# Patient Record
Sex: FEMALE | Race: BLACK OR AFRICAN AMERICAN | Employment: OTHER | URBAN - METROPOLITAN AREA
[De-identification: names, ages, dates, MRNs, and addresses within clinical notes are randomized per-mention and may not be internally consistent; named-entity substitution may affect disease eponyms.]

---

## 2017-12-06 ENCOUNTER — HOSPITAL ENCOUNTER (OUTPATIENT)
Dept: PHYSICAL THERAPY | Age: 82
Discharge: HOME | End: 2017-12-06
Payer: MEDICARE

## 2017-12-06 ENCOUNTER — APPOINTMENT (OUTPATIENT)
Dept: PHYSICAL THERAPY | Age: 82
End: 2017-12-06
Payer: MEDICARE

## 2017-12-06 PROCEDURE — 97163 PT EVAL HIGH COMPLEX 45 MIN: CPT

## 2017-12-06 PROCEDURE — G8978 MOBILITY CURRENT STATUS: HCPCS

## 2017-12-06 PROCEDURE — G8979 MOBILITY GOAL STATUS: HCPCS

## 2017-12-06 PROCEDURE — 97530 THERAPEUTIC ACTIVITIES: CPT

## 2017-12-06 PROCEDURE — 97110 THERAPEUTIC EXERCISES: CPT

## 2017-12-06 NOTE — PROGRESS NOTES
PT DAILY TREATMENT NOTE - [facility]     Patient Name: Werner Bee  Date:2017  : 1931  [x]  Patient  Verified  Payor: [place] MED[place]ARE / Plan: [place] MEDICARE PART A & B / Product Type: Medicare /    In time:230  Out time:330  Total Treatment Time (min): 60  Total Timed Codes (min): 23  1:1 Treatment Time ( [address] only): 60   Visit #: 1 of 10    Treatment Area:  Other abnormalities of gait and mobility [R26.89]    Physical Therapy Evaluation  Neurologic    SUBJECTIVE      Any medication changes, allergies to medications, adverse drug reactions, diagnosis change, or new procedure performed?: [x] No    [] Yes (see summary sheet for update)    Subjective functional status/changes:     PLOF: (I) Self-care and household ADLs, FWW with outside ambulation, SPC with in-home ambulation, No falls >6 months, Chronic reduction in overall activity tolerance  Current Functional Status: Use of FWW with outdoor ambulation, FWW/SPC with in-home ambulation, Assistance with self-care ADLs, Assistance with completion of household chores  Work Hx: Does not work  Living Situation: Lives in 1 story (No [employer]) - Lives alone  Comorbidities: Diabetes, HTN, Arthritis, Thyroid Problems, Depression  Medications: None taken secondary to R knee pain    Pain Intensity (0-10, VAS): Current 4, Worst 10, Best 0    Patient Goals: \"Walk better\", \"Not be dizzy\"    OBJECTIVE EXAMINATION    BP: 108/58 mmHg (sit-to-stand: 88/44 mmHg)  HR: 80 bpm    Cervical Screen: Patient without reproduction of dizziness with completion of following cervical AROM: Flexion, Extension, L Rotation, R Rotation  Ocular AROM: Slight reproduction of dizziness with upward gaze    Gait: Slowed gait with use of FWW    Strength (MMT): (tested in seated posture)      Hip L (1-5) R (1-5)   Hip Flexion 3+ 3+   Hip Ext NT NT   Hip ABD NT NT   Hip ER 4 3+   Hip IR 4 3+     Knee L (1-5) R (1-5)   Knee Flexion 5 5   Knee Extension 3+ 3+   Ankle PF     Ankle DF 5 4     Sensation: Intact, symmetrical sensation L2-S1 bilaterally    **Secondary to significant reduction in patient BP with completion of sit-to-stand and patient recent admittance to hospital secondary to SoB initial evaluation limited in overall thoroughness in order to ensure patient overall tolerance. OBJECTIVE    37 min [x]Eval                  []Re-Eval     10 min Therapeutic Exercise:  [x] See flow sheet : Patient educated in completion of prescribed HEP and provided with written HEP instructions, Patient and family educated regarding diagnosis and PT PoC   Rationale: increase ROM and increase strength to improve the patients ability to improve overall activity tolerance and functional independence    13 min Therapeutic Activity:  [x]  See flow sheet : Reviewed with family and patient ways to avoid dizziness secondary to presentation consistent with orthostatic hypotension with functional ADLs with review of the importance of generalized activity to improve BP   Rationale: increase ROM and increase strength  to improve the patients ability to improve safety with functional household ADLs     With   [] TE   [] TA   [] neuro   [] other: Patient Education: [x] Review HEP    [] Progressed/Changed HEP based on:   [] positioning   [] body mechanics   [] transfers   [] heat/ice application    [] other:      Pain Level (0-10 scale) post treatment: 4    ASSESSMENT/Changes in Function: Patient presents with an insidious onset of generalized weakness and deconditioning 2 months prior to I.E with patient with PMH significant for diabetes and HTN. Patient with recent hospitalization 12/4-12/5 secondary to shortness of breath with patient reporting all cardiac testing normal with patient having received MD clearance prior to PT evaluation. Patient reports generalized lightheadedness and \"fogginess\", with exacerbation reported with prolonged physical activity, quick movements and transitional positions.  Patient reports history of one fall November 2017 with patient reporting no dizziness prior but reports loss of consciousness afterwards. Secondary to symptoms patient reports requiring onset of assistance with completion of self-care and household ADLs within last few months. Patient demonstrates normal cervical AROM with slight dizziness reported with non-specific cervical movement with resolution with return to neutral cervical positioning. Slight dizziness reproduction demonstrated with upward visual gaze. Patient demonstrates signs and symptoms consistent with orthostatic hypotension with reduction in BP from 108/58 mmHg to 88/44 mmHg with completion of sit-to-stand with patient subjectively symptomatic secondarily. With completion of seated LE strength assessment patient demonstrates generalized LE weakness (R>L) with patient demonstrating intact, symmetrical sensation bilaterally. Patient noted with bilateral LE peripheral edema, non-pitting, with patient reporting prescription of thigh-high compression stockings while hospitalized secondarily. Secondary to significant reduction in patient BP with completion of sit-to-stand and patient recent admittance to hospital secondary to [facility] initial evaluation limited in overall thoroughness in order to ensure patient overall tolerance. Question onset of generalized weakness and deconditioning secondary to hypotensive presentation with emphasis of treatment to be placed on improving generalized activity tolerance, strength of bilateral LE and cardiovascular endurance to decrease falls risk and improve independence. With hypotensive presentation patient may be limited in tolerance to within clinic treatment.     Patient will continue to benefit from skilled PT services to modify and progress therapeutic interventions, address functional mobility deficits, address ROM deficits, address strength deficits, analyze and address soft tissue restrictions, analyze and cue movement patterns, analyze and modify body mechanics/ergonomics and assess and modify postural abnormalities to attain remaining goals. [x]  See Plan of Care  []  See progress note/recertification  []  See Discharge Summary         Progress towards goals / Updated goals:    Short Term Goals: To be accomplished in 2-3 weeks:  1. Patient will subjectively report full compliance with prescribed HEP. Eval: HEP provided  2. Patient will demonstrate ability to tolerate 30 minutes of therapeutic exercise with minimal rest breaks in order to improve activity tolerance. Eval: NT  3. Patient will demonstrate L/R hip flexion and L/R knee extension MMT >/=4+/5 to improve ease with stair management. Eval: L Hip Flex 3+/5, L Knee Ext 3+/5, R Hip Flex 3+/5, R Knee Ext 3+/5    Long Term Goals: To be accomplished in 5 weeks:  1. Patient will demonstrate a significant functional improvement as demonstrated by a score of >/= 58 on FOTO. Eval: FOTO = 12  2. Patient will demonstrate ability to complete TUG (10 ft) </=20 seconds to thus demonstrate a reduced falls risk. Eval: NT, Secondary to poor activity tolerance  3. Patient will demonstrate ability to ascend/descend 4 stairs with a step-to gait pattern with single UE handrail assist and SPC with superversion to improve community access.    Eval: NT, Secondary to poor activity tolerance    PLAN  [x]  Upgrade activities as tolerated     []  Continue plan of care  []  Update interventions per flow sheet       []  Discharge due to:_  []  Other:_      Balwinder Davis PT 12/6/2017  1:24 PM    Future Appointments  Date Time Provider Francisco Vazquez   12/6/2017 2:30 PM Balwinder Davis PT [facility]

## 2017-12-06 NOTE — PROGRESS NOTES
[facility]              [address]        [place], [address] [place]   [phone] fax    Plan of Care/ Statement of Necessity for Physical Therapy Services    Patient name: Immanuel Bowers Start of Care: 2017   Referral source: Gretchen Bowen MD : 1931    Medical Diagnosis: Other abnormalities of gait and mobility [R26.89]   Onset Date:2-3 months prior to I.E. Treatment Diagnosis: Generalized Deconditioning   Prior Hospitalization: see medical history Provider#: [id number]   Medications: Verified on Patient summary List    Comorbidities: Diabetes, HTN, Arthritis, Thyroid Problems, Depression, R TKA, R SIGRID (anterior approach)   Prior Level of Function: (I) Self-care and household ADLs, FWW with outside ambulation, SPC with in-home ambulation, No falls >6 months, Chronic reduction in overall activity tolerance      The Plan of Care and following information is based on the information from the initial evaluation. Assessment:    Patient presents with an insidious onset of generalized weakness and deconditioning 2 months prior to I.E with patient with PMH significant for diabetes and HTN. Patient with recent hospitalization - secondary to shortness of breath with patient reporting all cardiac testing normal with patient having received MD clearance prior to PT evaluation. Patient reports generalized lightheadedness and \"fogginess\", with exacerbation reported with prolonged physical activity, quick movements and transitional positions. Patient reports history of one fall 2017 with patient reporting no dizziness prior but reports loss of consciousness afterwards. Secondary to symptoms patient reports requiring onset of assistance with completion of self-care and household ADLs within last few months.  Patient demonstrates normal cervical AROM with slight dizziness reported with non-specific cervical movement with resolution with return to neutral cervical positioning. Slight dizziness reproduction demonstrated with upward visual gaze. Patient demonstrates signs and symptoms consistent with orthostatic hypotension with reduction in BP from 108/58 mmHg to 88/44 mmHg with completion of sit-to-stand with patient subjectively symptomatic secondarily. With completion of seated LE strength assessment patient demonstrates generalized LE weakness (R>L) with patient demonstrating intact, symmetrical sensation bilaterally. Patient noted with bilateral LE peripheral edema, non-pitting, with patient reporting prescription of thigh-high compression stockings while hospitalized secondarily. Secondary to significant reduction in patient BP with completion of sit-to-stand and patient recent admittance to hospital secondary to [facility] initial evaluation limited in overall thoroughness in order to ensure patient overall tolerance. Question onset of generalized weakness and deconditioning secondary to hypotensive presentation with emphasis of treatment to be placed on improving generalized activity tolerance, strength of bilateral LE and cardiovascular endurance to decrease falls risk and improve independence. With hypotensive presentation patient may be limited in tolerance to within clinic treatment.     Patient will continue to benefit from skilled PT services to modify and progress therapeutic interventions, address functional mobility deficits, address ROM deficits, address strength deficits, analyze and address soft tissue restrictions, analyze and cue movement patterns, analyze and modify body mechanics/ergonomics and assess and modify postural abnormalities to attain remaining goals.     Key Information:    BP: 108/58 mmHg (sit-to-stand: 88/44 mmHg)  HR: 80 bpm     Cervical Screen: Patient without reproduction of dizziness with completion of following cervical AROM: Flexion, Extension, L Rotation, R Rotation  Ocular AROM: Slight reproduction of dizziness with upward gaze     Gait: Slowed gait with use of FWW     Strength (MMT): (tested in seated posture)      Hip L (1-5) R (1-5)   Hip Flexion 3+ 3+   Hip Ext NT NT   Hip ABD NT NT   Hip ER 4 3+   Hip IR 4 3+      Knee L (1-5) R (1-5)   Knee Flexion 5 5   Knee Extension 3+ 3+   Ankle PF       Ankle DF 5 4      Sensation: Intact, symmetrical sensation L2-S1 bilaterally     **Secondary to significant reduction in patient BP with completion of sit-to-stand and patient recent admittance to hospital secondary to SoB initial evaluation limited in overall thoroughness in order to ensure patient overall tolerance.     Evaluation Complexity History HIGH Complexity :3+ comorbidities / personal factors will impact the outcome/ POC ; Examination HIGH Complexity : 4+ Standardized tests and measures addressing body structure, function, activity limitation and / or participation in recreation  ;Presentation HIGH Complexity : Unstable and unpredictable characteristics  ; Clinical Decision Making HIGH Complexity : FOTO score of 1- 25   Overall Complexity Rating: HIGH   Problem List: pain affecting function, decrease ROM, decrease strength, impaired gait/ balance, decrease ADL/ functional abilitiies, decrease activity tolerance, decrease flexibility/ joint mobility and decrease transfer abilities   Treatment Plan may include any combination of the following: Therapeutic exercise, Therapeutic activities, Neuromuscular re-education, Physical agent/modality, Gait/balance training, Manual therapy, Patient education, Self Care training, Functional mobility training, Home safety training and Stair training  Patient / Family readiness to learn indicated by: asking questions, trying to perform skills and interest  Persons(s) to be included in education: patient (P)  Barriers to Learning/Limitations: None  Patient Goal (s): Walk better, \"Not be dizzy\"  Patient Self Reported Health Status: good  Rehabilitation Potential: fair    Short Term Goals: To be accomplished in 2-3 weeks:  1. Patient will subjectively report full compliance with prescribed HEP. 2. Patient will demonstrate ability to tolerate 30 minutes of therapeutic exercise with minimal rest breaks in order to improve activity tolerance. 3. Patient will demonstrate L/R hip flexion and L/R knee extension MMT >/=4+/5 to improve ease with stair management. Long Term Goals: To be accomplished in 5 weeks:  1. Patient will demonstrate a significant functional improvement as demonstrated by a score of >/= 58 on FOTO. 2. Patient will demonstrate ability to complete TUG (10 ft) </=20 seconds to thus demonstrate a reduced falls risk. 3. Patient will demonstrate ability to ascend/descend 4 stairs with a step-to gait pattern with single UE handrail assist and SPC with superversion to improve community access. Frequency / Duration: Patient to be seen 2 times per week for 5 weeks. Patient/ Caregiver education and instruction: Diagnosis, prognosis, self care, activity modification and exercises   [x]  Plan of care has been reviewed with PTA    G-Codes (GP)  Mobility   Current  CM= 80-99%   Goal  CK= 40-59%    The severity rating is based on clinical judgment and the FOTO score. Certification Period: 12/6/2017 - 2/4/2017  David Arriola, PT 12/6/2017 1:25 PM  ________________________________________________________________________    I certify that the above Therapy Services are being furnished while the patient is under my care. I agree with the treatment plan and certify that this therapy is necessary.     [de-identified] Signature:____________________  Date:____________Time: _________    Please sign and return to [facility] [address], [facility]   [phone] fax

## 2017-12-07 ENCOUNTER — HOSPITAL ENCOUNTER (OUTPATIENT)
Dept: PHYSICAL THERAPY | Age: 82
Discharge: HOME | End: 2017-12-07
Payer: MEDICARE

## 2017-12-07 PROCEDURE — 97530 THERAPEUTIC ACTIVITIES: CPT

## 2017-12-07 PROCEDURE — 97110 THERAPEUTIC EXERCISES: CPT

## 2017-12-07 NOTE — PROGRESS NOTES
PT DAILY TREATMENT NOTE - [facility]     Patient Name: Maty Cardenas  Date:2017  : 1931  [x]  Patient  Verified  Payor: [place] MEDICARE / Plan: [place] MEDICARE PART A & B / Product Type: Medicare /    In time:2:07  Out time:2:44  Total Treatment Time (min): 37  Total Timed Codes (min): 37  1:1 Treatment Time ([facility] only): 33 (5 min getting a new BP cuff and discussing BP)   Visit #: 2 of 10    Treatment Area: Other abnormalities of gait and mobility [R26.89]    SUBJECTIVE  Pain Level (0-10 scale): 0/10  Any medication changes, allergies to medications, adverse drug reactions, diagnosis change, or new procedure performed?: [x] No    [] Yes (see summary sheet for update)  Subjective functional status/changes:   [] No changes reported  Patient stated she is a little SOB, but not as bad as she was yesterday. Seated HR: 78 BPM.   OBJECTIVE    25 min Therapeutic Exercise:  [x] See flow sheet :   Rationale: increase ROM and increase strength to improve the patients ability to perform ADLs. 8 min Therapeutic Activity:  [x]  See flow sheet : transfer training    Rationale: increase strength and transfer training   to improve the patients ability to transfer sit to stand safely. Withrapist reviwee   [] TE   [] TA   [] neuro   [] other: Patient Education: [x] Review HEP    [] Progressed/Changed HEP based on:   [] positioning   [] body mechanics   [] transfers   [] heat/ice application    [] other:      Other Objective/Functional Measures:    BP seated: 108/62        Stand: 98/50  HR in sittin bpm  Pain Level (0-10 scale) post treatment: 0/10    ASSESSMENT/Changes in Function: Patient reported no pain at end of session and SOB did not worsen. Patient required multiple verbal and tactile cues for form with exercises. Therapist reviewed HEP with patient and corrected tricep dip form. Therapist cued patient for proper breath control as patient has a tendency to hold her breath.      Patient will continue to benefit from skilled PT services to modify and progress therapeutic interventions, address functional mobility deficits, address ROM deficits, address strength deficits, analyze and address soft tissue restrictions, analyze and cue movement patterns, assess and modify postural abnormalities and address imbalance/dizziness to attain remaining goals. []  See Plan of Care  []  See progress note/recertification  []  See Discharge Summary         Progress towards goals / Updated goals:  Short Term Goals: To be accomplished in 2-3 weeks:  1. Patient will subjectively report full compliance with prescribed HEP. Eval: HEP provided  Current: Goal met (12/7/17)  2. Patient will demonstrate ability to tolerate 30 minutes of therapeutic exercise with minimal rest breaks in order to improve activity tolerance. Eval: NT  3. Patient will demonstrate L/R hip flexion and L/R knee extension MMT >/=4+/5 to improve ease with stair management. Eval: L Hip Flex 3+/5, L Knee Ext 3+/5, R Hip Flex 3+/5, R Knee Ext 3+/5     Long Term Goals: To be accomplished in 5 weeks:  1. Patient will demonstrate a significant functional improvement as demonstrated by a score of >/= 58 on FOTO. Eval: FOTO = 12  2. Patient will demonstrate ability to complete TUG (10 ft) </=20 seconds to thus demonstrate a reduced falls risk. Eval: NT, Secondary to poor activity tolerance  3. Patient will demonstrate ability to ascend/descend 4 stairs with a step-to gait pattern with single UE handrail assist and SPC with superversion to improve community access.    Eval: NT, Secondary to poor activity tolerance       PLAN  []  Upgrade activities as tolerated     [x]  Continue plan of care  []  Update interventions per flow sheet       []  Discharge due to:_  []  Other:_      Sheldon Singh PT 12/7/2017  2:08 PM    Future Appointments  Date Time Provider Francisco Vazquez   12/13/2017 12:30 PM Sheldon Singh, PT [facility]   12/20/2017 11:30 AM Juanita Zavala [facility]   12/28/2017 12:00 PM Ravinder Wood, PT [facility]   1/4/2018 2:00 PM Juanita Zavala [facility]   1/5/2018 3:00 PM Trevon Para, PT [facility]   1/9/2018 11:00 AM Jourdan Delgado, PTA [facility]   1/12/2018 11:30 AM Jourdan Delgado, PTA [facility]   1/16/2018 11:00 AM Jourdan Delgado, PTA [facility]   1/18/2018 11:00 AM Trevon Para, PT [facility]   1/23/2018 11:00 AM Jourdan Delgado, PTA [facility]   1/26/2018 11:30 AM Jourdan Delgado, PTA [facility]

## 2017-12-13 ENCOUNTER — HOSPITAL ENCOUNTER (OUTPATIENT)
Dept: PHYSICAL THERAPY | Age: 82
Discharge: HOME | End: 2017-12-13
Payer: MEDICARE

## 2017-12-13 PROCEDURE — 97112 NEUROMUSCULAR REEDUCATION: CPT

## 2017-12-13 PROCEDURE — 97110 THERAPEUTIC EXERCISES: CPT

## 2017-12-13 NOTE — PROGRESS NOTES
PT DAILY TREATMENT NOTE - [facility]     Patient Name: Yogesh Dumont  Date:2017  : 1931  [x]  Patient  Verified  Payor: David Trammell / Plan: [place] MED[place] & B / Product Type: Medicare /    In time: 12:29  Out time:1:10  Total Treatment Time (min): 41  Total Timed Codes (min): 41  1:1 Treatment Time ( [address] only): 41   Visit #: 3 of 10    Treatment Area: Other abnormalities of gait and mobility [R26.89]    SUBJECTIVE  Pain Level (0-10 scale): 5/10- (R) knee. Any medication changes, allergies to medications, adverse drug reactions, diagnosis change, or new procedure performed?: [x] No    [] Yes (see summary sheet for update)  Subjective functional status/changes:   [] No changes reported  Patiient stated that her SOB is better right now. Patient's caregiver took BP this morning and it was 92/54 (sitting) and HR: 85 bpm. Patient stated that her (R) knee is bothering her where she got the knee replacement done in the past.   92/54   HR: 85 BPM    OBJECTIVE    26 min Therapeutic Exercise:  [x] See flow sheet :   Rationale: increase ROM and increase strength to improve the patients ability to increase ease with ADls. 15 min Neuromuscular Re-education:  [x]  See flow sheet :   Rationale: increase strength, improve coordination, improve balance and increase proprioception  to improve the patients ability to ambulate safely         With   [] TE   [] TA   [] neuro   [] other: Patient Education: [x] Review HEP    [] Progressed/Changed HEP based on:   [] positioning   [] body mechanics   [] transfers   [] heat/ice application    [] other:      Other Objective/Functional Measures:   HR: 80 BPM.   BP: Seated 118/68           Stand: 120/66     HR approx 5 minutes after walking was 80BPM and patient denied feeling SOB after sitting and resting. Patient stated SOB worsens if she tries and talks. Patient was able to maintain balance for 20 seconds without HHA on a hard surface with EO and with a wide EVONNE. Pain Level (0-10 scale) post treatment: 0/10     ASSESSMENT/Changes in Function: Patient had minimal SOB during exercises, and therapist asked frequently if she needed to rest, but patient stated she was doing fine and continued with the exercises. Patient did well with the new exercises and had minimal difficulty. Patient has difficulty reaching above approximately 90 deg due to (R) shoulder issues. Patient stated she felt fine at end of session and No SOB, no dizziness, and reported no pain in (R) knee when she was sitting. Patient will continue to benefit from skilled PT services to modify and progress therapeutic interventions, address functional mobility deficits, address ROM deficits, address strength deficits, analyze and address soft tissue restrictions, analyze and cue movement patterns, assess and modify postural abnormalities and address imbalance/dizziness to attain remaining goals. []  See Plan of Care  []  See progress note/recertification  []  See Discharge Summary         Progress towards goals / Updated goals:  Short Term Goals: To be accomplished in 2-3 weeks:  1. Patient will subjectively report full compliance with prescribed HEP. Eval: HEP provided  Current: Goal met (12/7/17)  2. Patient will demonstrate ability to tolerate 30 minutes of therapeutic exercise with minimal rest breaks in order to improve activity tolerance. Eval: NT  3. Patient will demonstrate L/R hip flexion and L/R knee extension MMT >/=4+/5 to improve ease with stair management. Eval: L Hip Flex 3+/5, L Knee Ext 3+/5, R Hip Flex 3+/5, R Knee Ext 3+/5      Long Term Goals: To be accomplished in 5 weeks:  1. Patient will demonstrate a significant functional improvement as demonstrated by a score of >/= 58 on FOTO. Eval: FOTO = 12  2. Patient will demonstrate ability to complete TUG (10 ft) </=20 seconds to thus demonstrate a reduced falls risk. Eval: NT, Secondary to poor activity tolerance  3.  Patient will demonstrate ability to ascend/descend 4 stairs with a step-to gait pattern with single UE handrail assist and SPC with superversion to improve community access.    Eval: NT, Secondary to poor activity tolerance          PLAN  []  Upgrade activities as tolerated     [x]  Continue plan of care  []  Update interventions per flow sheet       []  Discharge due to:_  []  Other:_      Pieter Vu, PT 12/13/2017  12:30 PM    Future Appointments  Date Time Provider Francisco Vazquez   12/20/2017 11:30 AM Pasco Finder, PTA [facility]   12/28/2017 12:00 PM Pieter Vu, PT [facility]   1/4/2018 2:00 PM Pasco Finder, PTA [facility]   1/5/2018 3:00 PM Colleen Hogan, PT [facility]   1/9/2018 11:00 AM Pasco Finder, PTA [facility]   1/12/2018 11:30 AM Pasco Finder, PTA [facility]   1/16/2018 11:00 AM Pasco Finder, PTA [facility]   1/18/2018 11:00 AM Colleen Samira, PT [facility]   1/23/2018 11:00 AM Pasco Finder, PTA [facility]   1/26/2018 11:30 AM Pasco Finder, PTA [facility]

## 2017-12-20 ENCOUNTER — HOSPITAL ENCOUNTER (OUTPATIENT)
Dept: PHYSICAL THERAPY | Age: 82
Discharge: HOME | End: 2017-12-20
Payer: MEDICARE

## 2017-12-20 PROCEDURE — 97112 NEUROMUSCULAR REEDUCATION: CPT

## 2017-12-20 PROCEDURE — 97110 THERAPEUTIC EXERCISES: CPT

## 2017-12-20 NOTE — PROGRESS NOTES
PT DAILY TREATMENT NOTE - [facility]     Patient Name: Mery Miller  Date:2017  : 1931  [x]  Patient  Verified  Payor: [place] MEDICARE / Plan: [place] MEDICARE PART A & B / Product Type: Medicare /    In time:11:28  Out time:12:15  Total Treatment Time (min): 47  Total Timed Codes (min): 47  1:1 Treatment Time ( [address] only): 40   Visit #: 4 of 10    Treatment Area: Other abnormalities of gait and mobility [R26.89]    SUBJECTIVE  Pain Level (0-10 scale): 5 R knee  Any medication changes, allergies to medications, adverse drug reactions, diagnosis change, or new procedure performed?: [x] No    [] Yes (see summary sheet for update)  Subjective functional status/changes:   [] No changes reported  Pt reports she is feeling well today. OBJECTIVE        Min Type Additional Details    [] Estim:  []Unatt       []IFC  []Premod                        []Other:  []w/ice   []w/heat  Position:  Location:    [] Estim: []Att    []TENS instruct  []NMES                    []Other:  []w/US   []w/ice   []w/heat  Position:  Location:    []  Traction: [] Cervical       []Lumbar                       [] Prone          []Supine                       []Intermittent   []Continuous Lbs:  [] before manual  [] after manual    []  Ultrasound: []Continuous   [] Pulsed                           []1MHz   []3MHz W/cm2:  Location:    []  Iontophoresis with dexamethasone         Location: [] Take home patch   [] In clinic    []  Ice     []  heat  []  Ice massage  []  Laser   []  Anodyne Position:  Location:    []  Laser with stim  []  Other:  Position:  Location:    []  Vasopneumatic Device Pressure:       [] lo [] med [] hi   Temperature: [] lo [] med [] hi   [] Skin assessment post-treatment:  []intact []redness- no adverse reaction    []redness  adverse reaction:       39 min Therapeutic Exercise:  [x] See flow sheet :   Rationale: increase ROM and increase strength to improve the patients ability to increase tolerance to activities. 8 min Neuromuscular Re-education:  [x]  See flow sheet :balance exercises. Rationale: increase ROM, increase strength, improve coordination and improve balance  to improve the patients ability to increase ease with ADLs. With   [] TE   [] TA   [] neuro   [] other: Patient Education: [x] Review HEP    [] Progressed/Changed HEP based on:   [] positioning   [] body mechanics   [] transfers   [] heat/ice application    [] other:      Other Objective/Functional Measures: Pt with able to tolerated 30 mins of exercises with moderate rest breaks. Pain Level (0-10 scale) post treatment: 0    ASSESSMENT/Changes in Function: Continue to progress pt as tolerated. Pt did not report any dizziness with sit to stand. Updated pt's HEP due to being out of town for a couple of weeks and provided pt with yellow theraband. Patient will continue to benefit from skilled PT services to modify and progress therapeutic interventions, address functional mobility deficits, address ROM deficits, address strength deficits and analyze and address soft tissue restrictions to attain remaining goals. []  See Plan of Care  []  See progress note/recertification  []  See Discharge Summary         Progress towards goals / Updated goals:  Short Term Goals: To be accomplished in 2-3 weeks:  1. Patient will subjectively report full compliance with prescribed HEP. Eval: HEP provided  Current: Goal met (12/7/17)  2. Patient will demonstrate ability to tolerate 30 minutes of therapeutic exercise with minimal rest breaks in order to improve activity tolerance. Eval: NT  Current: Progressing: Pt with able to tolerated 30 mins of exercises with moderate rest breaks. 12/20/17  3. Patient will demonstrate L/R hip flexion and L/R knee extension MMT >/=4+/5 to improve ease with stair management. Eval: L Hip Flex 3+/5, L Knee Ext 3+/5, R Hip Flex 3+/5, R Knee Ext 3+/5      Long Term Goals: To be accomplished in 5 weeks:  1. Patient will demonstrate a significant functional improvement as demonstrated by a score of >/= 58 on FOTO. Eval: FOTO = 12  2. Patient will demonstrate ability to complete TUG (10 ft) </=20 seconds to thus demonstrate a reduced falls risk. Eval: NT, Secondary to poor activity tolerance  3. Patient will demonstrate ability to ascend/descend 4 stairs with a step-to gait pattern with single UE handrail assist and SPC with superversion to improve community access.    Eval: NT, Secondary to poor activity tolerance        PLAN  []  Upgrade activities as tolerated     [x]  Continue plan of care  []  Update interventions per flow sheet       []  Discharge due to:_  []  Other:_      Yuan Allen, YASEMIN 12/20/2017  11:55 AM    Future Appointments  Date Time Provider Francisco Vazquez   12/28/2017 12:00 PM Leonidas Booker PT [facility]   1/4/2018 2:00 PM Yuan Allen, PTA [facility]   1/5/2018 3:00 PM Wilfredo Cerda PT [facility]   1/9/2018 11:00 AM Yuan Allen, PTA [facility]   1/12/2018 11:30 AM Yuan Allen, PTA [facility]   1/16/2018 11:00 AM Yuan Allen, PTA [facility]   1/18/2018 11:00 AM Wilfredo Cerda PT [facility]   1/23/2018 11:00 AM Yuan Allen, PTA [facility]   1/26/2018 11:30 AM Yuan Allen, PTA [facility]

## 2017-12-28 ENCOUNTER — HOSPITAL ENCOUNTER (OUTPATIENT)
Dept: PHYSICAL THERAPY | Age: 82
Discharge: HOME | End: 2017-12-28
Payer: MEDICARE

## 2017-12-28 PROCEDURE — 97112 NEUROMUSCULAR REEDUCATION: CPT

## 2017-12-28 PROCEDURE — G8980 MOBILITY D/C STATUS: HCPCS

## 2017-12-28 PROCEDURE — 97110 THERAPEUTIC EXERCISES: CPT

## 2017-12-28 PROCEDURE — G8979 MOBILITY GOAL STATUS: HCPCS

## 2017-12-28 NOTE — PROGRESS NOTES
PT DAILY TREATMENT NOTE - [facility]     Patient Name: Barrington Cano  Date:2017  : 1931  [x]  Patient  Verified  Payor: Allan Frey / Plan: [place] MEDICARE PART A & B / Product Type: Medicare /    In time: 12:00   Out time: 12:50  Total Treatment Time (min): 50  Total Timed Codes (min): 50  1:1 Treatment Time ( [address] only): 45   Visit #: 5 of 10    Treatment Area: Other abnormalities of gait and mobility [R26.89]    SUBJECTIVE  Pain Level (0-10 scale):  (R) knee: 4/10  Any medication changes, allergies to medications, adverse drug reactions, diagnosis change, or new procedure performed?: [x] No    [] Yes (see summary sheet for update)  Subjective functional status/changes:   [] No changes reported  Patient stated that the SOB has gotten better. Patient stated the SOB mostly occurs when she walks around a lot and tries to talk. Patient reports mild (R) knee pain today when she is walking. Patient stated that today is her last day because she is moving up to [place] with her daughter for the next 2 months. OBJECTIVE    40 min Therapeutic Exercise:  [x] See flow sheet : updated HEP and reviewed    Rationale: increase ROM and increase strength to improve the patients ability to perform ADls. 10 min Neuromuscular Re-education:  [x]  See flow sheet : balance ex. Rationale: increase strength, improve coordination, improve balance and increase proprioception  to improve the patients ability to ambulate safely. With   [] TE   [] TA   [] neuro   [] other: Patient Education: [x] Review HEP    [] Progressed/Changed HEP based on:   [] positioning   [] body mechanics   [] transfers   [] heat/ice application    [] other:      Other Objective/Functional Measures:   Seated: 130/80  Stand: 124/70    TUG test: 18seconds.     HIp Flex: (R)4-/5 (L)  4/5Knee extension: (R) 4-/5 with report of pain across joint line (L): 4+/5   Pain Level (0-10 scale) post treatment: 0/10    ASSESSMENT/Changes in Function: See D/C note. Therapist reviewed HEP with patient and patient's daughter who she is going to live with. Discussed importance of safety and having daughter next to patient when she is performing standing exercises and sit to stand transfers. Patient's daughter had a feq questions regarding medications for the patient's knee, and therapist advised the daughter to consult the patient's doctor. Patient reported no pain and no SOB issues at end of session. Patient will continue to benefit from skilled PT services to modify and progress therapeutic interventions, address functional mobility deficits, address ROM deficits, address strength deficits, analyze and address soft tissue restrictions, analyze and cue movement patterns, assess and modify postural abnormalities and address imbalance/dizziness to attain remaining goals. []  See Plan of Care  []  See progress note/recertification  []  See Discharge Summary         Progress towards goals / Updated goals:  Short Term Goals: To be accomplished in 2-3 weeks:  1. Patient will subjectively report full compliance with prescribed HEP. Eval: HEP provided  Current: Goal met (12/7/17)  2. Patient will demonstrate ability to tolerate 30 minutes of therapeutic exercise with minimal rest breaks in order to improve activity tolerance. Eval: NT  Current: Progressing: Pt with able to tolerated 30 mins of exercises with moderate rest breaks. 12/20/17  3. Patient will demonstrate L/R hip flexion and L/R knee extension MMT >/=4+/5 to improve ease with stair management. Eval: L Hip Flex 3+/5, L Knee Ext 3+/5, R Hip Flex 3+/5, R Knee Ext 3+/5   Current: HIp Flex: (R)4-/5 (L)  4/5; Knee extension: (R) 4-/5 with report of pain across joint line (L): 4+/5 (12/28/17)       Long Term Goals: To be accomplished in 5 weeks:  1. Patient will demonstrate a significant functional improvement as demonstrated by a score of >/= 58 on FOTO. Eval: FOTO = 12  Current: FOTO: 53 (12/2817)   2. Patient will demonstrate ability to complete TUG (10 ft) </=20 seconds to thus demonstrate a reduced falls risk. Eval: NT, Secondary to poor activity tolerance  Current: Goal MET. TUG test: 18 seconds with use of RW.   3. Patient will demonstrate ability to ascend/descend 4 stairs with a step-to gait pattern with single UE handrail assist and SPC with superversion to improve community access. Eval: NT, Secondary to poor activity tolerance  Current: Not assessed.      PLAN  []  Upgrade activities as tolerated     [x]  Continue plan of care  []  Update interventions per flow sheet       []  Discharge due to:_  []  Other:_      Jerry Driscoll, PT 12/28/2017  11:56 AM    Future Appointments  Date Time Provider Francisco Vazquez   12/28/2017 12:00 PM Jerry Driscoll, PT [facility]   1/4/2018 2:00 PM Adele Pink, PTA [facility]   1/5/2018 3:00 PM Lam Parker PT [facility]   1/9/2018 11:00 AM Adele Keas, PTA [facility]   1/12/2018 11:30 AM Adele Keas, PTA [facility]   1/16/2018 11:00 AM Adele Keas, PTA [facility]   1/18/2018 11:00 AM Luna Andersen, PT [facility]   1/23/2018 11:00 AM Adele Keas, PTA [facility]   1/26/2018 11:30 AM Adele Keas, PTA [facility]

## 2018-01-04 ENCOUNTER — APPOINTMENT (OUTPATIENT)
Dept: PHYSICAL THERAPY | Age: 83
End: 2018-01-04

## 2018-01-05 ENCOUNTER — APPOINTMENT (OUTPATIENT)
Dept: PHYSICAL THERAPY | Age: 83
End: 2018-01-05

## 2018-01-09 ENCOUNTER — APPOINTMENT (OUTPATIENT)
Dept: PHYSICAL THERAPY | Age: 83
End: 2018-01-09

## 2018-01-12 ENCOUNTER — APPOINTMENT (OUTPATIENT)
Dept: PHYSICAL THERAPY | Age: 83
End: 2018-01-12

## 2018-01-16 ENCOUNTER — APPOINTMENT (OUTPATIENT)
Dept: PHYSICAL THERAPY | Age: 83
End: 2018-01-16

## 2018-01-17 NOTE — PROGRESS NOTES
[facility]              [address]        [place], [address]   [phone] fax      Discharge Summary  Patient name: Akira Diana Start of Care: 2017   Referral source: Kel Eagle MD : 1931   Medical/Treatment Diagnosis: Other abnormalities of gait and mobility [R26.89] Onset Date: 2-3 months prior to I.E. Prior Hospitalization: see medical history Provider#: [id number]   Medications: Verified on Patient Summary List    Comorbidities: Diabetes, HTN, Arthritis, Thyroid Problems, Depression, R TKA, R SIGRID (anterior approach)  Prior Level of Function: (I) Self-care and household ADLs, FWW with outside ambulation, SPC with in-home ambulation, No falls >6 months, Chronic reduction in overall activity tolerance  Visits from Start of Care: 5    Missed Visits: 0  Reporting Period : 17 to 17      Summary of Care: Patient has made steady progress in PT with improved endurance, increased strength, improved TUG test, and an increase in function as reflected by the FOTO assessment which improved by 41 points since [facility]. Patient stated that SOB mostly occurs when she walks around a lot and tries to talk. Patient requested to discharge PT because she is moving to [place] with her daughter for the next 2 months. Therapist reviewed updated HEP with patient and patient's daughter who she is going to live with. Discussed importance of safety and having daughter next to patient when she is performing standing exercises and sit to stand transfers. Short Term Goals: To be accomplished in 2-3 weeks:  1. Patient will subjectively report full compliance with prescribed HEP. Eval: HEP provided  Current: Goal met   2. Patient will demonstrate ability to tolerate 30 minutes of therapeutic exercise with minimal rest breaks in order to improve activity tolerance.   Eval: NT  Current: Progressing: Pt with able to tolerated 30 mins of exercises with moderate rest breaks. 3. Patient will demonstrate L/R hip flexion and L/R knee extension MMT >/=4+/5 to improve ease with stair management. Eval: L Hip Flex 3+/5, L Knee Ext 3+/5, R Hip Flex 3+/5, R Knee Ext 3+/5   Current: Hip Flex: (R)4-/5 (L)  4/5; Knee extension: (R) 4-/5 with report of pain across joint line (L): 4+/5     Long Term Goals: To be accomplished in 5 weeks:  1. Patient will demonstrate a significant functional improvement as demonstrated by a score of >/= 58 on FOTO. Eval: FOTO = 12  Current: FOTO: 53   2. Patient will demonstrate ability to complete TUG (10 ft) </=20 seconds to thus demonstrate a reduced falls risk. Eval: NT, Secondary to poor activity tolerance  Current: Goal MET. TUG test: 18 seconds with use of RW.   3. Patient will demonstrate ability to ascend/descend 4 stairs with a step-to gait pattern with single UE handrail assist and SPC with superversion to improve community access. Eval: NT, Secondary to poor activity tolerance  Current: Not assessed. G-Codes (GP)  Mobility   [id number] Goal  CK= 40-59%  D/C  CK= 40-59%    The severity rating is based on clinical judgment and the FOTO Score score.     ASSESSMENT/RECOMMENDATIONS:  [x]Discontinue therapy: [x]Patient has reached or is progressing toward set goals      []Patient is non-compliant or has abdicated      []Due to lack of appreciable progress towards set goals    Rolf Pradhan, PT 1/17/2018 3:34 PM

## 2018-01-18 ENCOUNTER — APPOINTMENT (OUTPATIENT)
Dept: PHYSICAL THERAPY | Age: 83
End: 2018-01-18

## 2018-01-23 ENCOUNTER — APPOINTMENT (OUTPATIENT)
Dept: PHYSICAL THERAPY | Age: 83
End: 2018-01-23

## 2018-01-26 ENCOUNTER — APPOINTMENT (OUTPATIENT)
Dept: PHYSICAL THERAPY | Age: 83
End: 2018-01-26

## 2024-07-26 ENCOUNTER — APPOINTMENT (OUTPATIENT)
Age: 89
End: 2024-07-26
Payer: MEDICARE

## 2024-07-26 ENCOUNTER — HOSPITAL ENCOUNTER (EMERGENCY)
Age: 89
Discharge: NURSING, REHAB OR LONG-TERM CARE | End: 2024-07-27
Attending: FAMILY MEDICINE
Payer: MEDICARE

## 2024-07-26 DIAGNOSIS — F03.90 DEMENTIA, UNSPECIFIED DEMENTIA SEVERITY, UNSPECIFIED DEMENTIA TYPE, UNSPECIFIED WHETHER BEHAVIORAL, PSYCHOTIC, OR MOOD DISTURBANCE OR ANXIETY (HCC): ICD-10-CM
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
## 2024-07-26 DIAGNOSIS — Z79.01 ANTICOAGULATED BY ANTICOAGULATION TREATMENT: ICD-10-CM

## 2024-07-26 DIAGNOSIS — S01.01XA LACERATION OF SCALP, INITIAL ENCOUNTER: Primary | ICD-10-CM

## 2024-07-26 PROCEDURE — 99284 EMERGENCY DEPT VISIT MOD MDM: CPT

## 2024-07-26 PROCEDURE — 72125 CT NECK SPINE W/O DYE: CPT

## 2024-07-26 PROCEDURE — 12001 RPR S/N/AX/GEN/TRNK 2.5CM/<: CPT

## 2024-07-26 PROCEDURE — 70450 CT HEAD/BRAIN W/O DYE: CPT

## 2024-07-26 ASSESSMENT — PAIN SCALES - GENERAL: PAINLEVEL_OUTOF10: 5

## 2024-07-26 ASSESSMENT — PAIN - FUNCTIONAL ASSESSMENT: PAIN_FUNCTIONAL_ASSESSMENT: 0-10

## 2024-07-26 ASSESSMENT — PAIN DESCRIPTION - DESCRIPTORS: DESCRIPTORS: ACHING

## 2024-07-26 ASSESSMENT — LIFESTYLE VARIABLES
HOW MANY STANDARD DRINKS CONTAINING ALCOHOL DO YOU HAVE ON A TYPICAL DAY: PATIENT DOES NOT DRINK
HOW OFTEN DO YOU HAVE A DRINK CONTAINING ALCOHOL: NEVER

## 2024-07-26 ASSESSMENT — PAIN DESCRIPTION - LOCATION: LOCATION: HEAD

## 2024-07-27 VITALS
HEART RATE: 85 BPM | TEMPERATURE: 99 F | RESPIRATION RATE: 18 BRPM | DIASTOLIC BLOOD PRESSURE: 98 MMHG | SYSTOLIC BLOOD PRESSURE: 181 MMHG | OXYGEN SATURATION: 95 %

## 2024-07-27 PROCEDURE — 90715 TDAP VACCINE 7 YRS/> IM: CPT | Performed by: FAMILY MEDICINE

## 2024-07-27 PROCEDURE — 6360000002 HC RX W HCPCS: Performed by: FAMILY MEDICINE

## 2024-07-27 PROCEDURE — 90471 IMMUNIZATION ADMIN: CPT | Performed by: FAMILY MEDICINE

## 2024-07-27 RX ORDER — VENLAFAXINE HYDROCHLORIDE 75 MG/1
75 CAPSULE, EXTENDED RELEASE ORAL DAILY
COMMUNITY

## 2024-07-27 RX ORDER — ROSUVASTATIN CALCIUM 20 MG/1
20 TABLET, COATED ORAL DAILY
COMMUNITY

## 2024-07-27 RX ORDER — ERGOCALCIFEROL 1.25 MG/1
50000 CAPSULE ORAL WEEKLY
COMMUNITY

## 2024-07-27 RX ORDER — SAXAGLIPTIN AND METFORMIN HYDROCHLORIDE 1000; 5 MG/1; MG/1
1 TABLET, FILM COATED, EXTENDED RELEASE ORAL DAILY
COMMUNITY

## 2024-07-27 RX ORDER — CLONIDINE HYDROCHLORIDE 0.3 MG/1
0.3 TABLET ORAL 3 TIMES DAILY
COMMUNITY

## 2024-07-27 RX ORDER — METOPROLOL SUCCINATE 25 MG/1
25 TABLET, EXTENDED RELEASE ORAL DAILY
COMMUNITY

## 2024-07-27 RX ORDER — LEVOTHYROXINE SODIUM 112 UG/1
112 TABLET ORAL DAILY
COMMUNITY

## 2024-07-27 RX ORDER — FAMOTIDINE 20 MG/1
20 TABLET, FILM COATED ORAL DAILY
COMMUNITY

## 2024-07-27 RX ADMIN — TETANUS TOXOID, REDUCED DIPHTHERIA TOXOID AND ACELLULAR PERTUSSIS VACCINE, ADSORBED 0.5 ML: 5; 2.5; 8; 8; 2.5 SUSPENSION INTRAMUSCULAR at 00:19

## 2024-07-27 NOTE — ED PROVIDER NOTES
[facility] EMERGENCY DEPT  EMERGENCY DEPARTMENT ENCOUNTER      Pt Name: Jenny Cardenas  MRN: [id number]  Birthdate 1/14/1931  Date of evaluation: 7/26/2024  Provider: Will Del Valle DO  3:41 AM    CHIEF COMPLAINT       Chief Complaint   Patient presents with    Laceration         HISTORY OF PRESENT ILLNESS    Jenny Cardenas is a 89 y.o. female who presents to the emergency department patient comes in, via EMS.  As there is a laceration to the posterior aspect of her head.  Allegedly no loss of consciousness.  Patient has some dementia.  Making review of systems as well as HPI unreliable.  Patient does state that she has some pain in the posterior aspect of her head.    HPI    Nursing Notes were reviewed.    REVIEW OF SYSTEMS       Review of Systems   Unable to perform ROS: Dementia       Except as noted above the remainder of the review of systems was reviewed and negative.       PAST MEDICAL HISTORY     Past Medical History:   Diagnosis Date    Anemia     Anxiety     Atrial fibrillation (HCC)     CHF (congestive heart failure) (HCC)     Dementia (HCC)     Depression     Diabetes mellitus (HCC)     Hyperlipidemia     Hypertension     Lack of coordination     Muscle weakness     Neuralgia and neuritis     Thyroid disease     Venous insufficiency          SURGICAL HISTORY     History reviewed. No pertinent surgical history.      CURRENT MEDICATIONS       Previous Medications    CLONIDINE (CATAPRES) 0.3 MG TABLET    Take 1 tablet by mouth in the morning, at noon, and at bedtime    ERGOCALCIFEROL (ERGOCALCIFEROL) 1.25 MG (39842 UT) CAPSULE    Take 1 capsule by mouth once a week    FAMOTIDINE (PEPCID) 20 MG TABLET    Take 1 tablet by mouth daily    IRON-VITAMIN C  MG TABS    Take 1 tablet by mouth Daily with supper    LEVOTHYROXINE (SYNTHROID) 112 MCG TABLET    Take 1 tablet by mouth Daily    METOPROLOL SUCCINATE (TOPROL XL) 25 MG EXTENDED RELEASE TABLET    Take 1 tablet by mouth daily    RIVAROXABAN (XARELTO) 15 MG

## 2024-07-27 NOTE — ED NOTES
Released with [facility] to [facility] discharge instruction provided to Caron to give to staff.

## 2024-07-27 NOTE — DISCHARGE INSTRUCTIONS
As we spoke, there are a total of 3 sutures on your scalp, these will need to be removed in approximately 10 days.  We did update your tetanus as well.

## 2024-07-27 NOTE — ED TRIAGE NOTES
Received via EMS from [facility] after falling out of wheelchair. Laceration to left side of head, per EMS staff reports no LOC, is at baseline for mental status. Patient reports pain in head.

## 2024-08-15 ENCOUNTER — APPOINTMENT (OUTPATIENT)
Age: 89
End: 2024-08-15
Payer: MEDICARE

## 2024-08-15 ENCOUNTER — HOSPITAL ENCOUNTER (EMERGENCY)
Age: 89
Discharge: HOME | End: 2024-08-15
Attending: EMERGENCY MEDICINE
Payer: MEDICARE

## 2024-08-15 VITALS
DIASTOLIC BLOOD PRESSURE: 105 MMHG | RESPIRATION RATE: 18 BRPM | OXYGEN SATURATION: 99 % | TEMPERATURE: 97.6 F | HEART RATE: 85 BPM | SYSTOLIC BLOOD PRESSURE: 160 MMHG | WEIGHT: 131 LBS

## 2024-08-15 DIAGNOSIS — S01.01XA LACERATION OF SCALP, INITIAL ENCOUNTER: ICD-10-CM

## 2024-08-15 DIAGNOSIS — S09.90XA CLOSED HEAD INJURY, INITIAL ENCOUNTER: Primary | ICD-10-CM

## 2024-08-15 DIAGNOSIS — Z79.01 LONG TERM CURRENT USE OF ANTICOAGULANT: ICD-10-CM

## 2024-08-15 DIAGNOSIS — F03.90 DEMENTIA, UNSPECIFIED DEMENTIA SEVERITY, UNSPECIFIED DEMENTIA TYPE, UNSPECIFIED WHETHER BEHAVIORAL, PSYCHOTIC, OR MOOD DISTURBANCE OR ANXIETY (HCC): ICD-10-CM

## 2024-08-15 PROCEDURE — 99284 EMERGENCY DEPT VISIT MOD MDM: CPT

## 2024-08-15 PROCEDURE — 70450 CT HEAD/BRAIN W/O DYE: CPT

## 2024-08-15 PROCEDURE — 72125 CT NECK SPINE W/O DYE: CPT

## 2024-08-15 PROCEDURE — 2500000003 HC RX 250 WO HCPCS: Performed by: EMERGENCY MEDICINE

## 2024-08-15 PROCEDURE — 12002 RPR S/N/AX/GEN/TRNK2.6-7.5CM: CPT

## 2024-08-15 RX ORDER — LIDOCAINE HYDROCHLORIDE AND EPINEPHRINE 10; 10 MG/ML; UG/ML
20 INJECTION, SOLUTION INFILTRATION; PERINEURAL
Status: COMPLETED | OUTPATIENT
Start: 2024-08-15 | End: 2024-08-15

## 2024-08-15 RX ORDER — CEPHALEXIN 250 MG/1
250 CAPSULE ORAL 3 TIMES DAILY
Qty: 21 CAPSULE | Refills: 0 | Status: SHIPPED | OUTPATIENT
Start: 2024-08-15 | End: 2024-08-22

## 2024-08-15 RX ADMIN — LIDOCAINE HYDROCHLORIDE,EPINEPHRINE BITARTRATE 20 ML: 10; .01 INJECTION, SOLUTION INFILTRATION; PERINEURAL at 02:30

## 2024-08-15 NOTE — ED TRIAGE NOTES
Pt arrived via ems for a fall at [facility]. Pt has lac to forehead. Pt on blood thinners. Pt mental status at baseline, hx of dementia.

## 2024-08-15 NOTE — ED PROVIDER NOTES
EMERGENCY DEPARTMENT HISTORY AND PHYSICAL EXAM    Date: 8/15/2024  Patient Name: Jenny Cardenas    History of Presenting Illness     Chief Complaint   Patient presents with    Fall         History Provided By: EMS and old notes and record    Additional History (Context):   12:54 AM EDT  Jenny Cardenas is a 89 y.o. female with PMHX of many multiple comorbidities including high blood pressure thyroid disease hyperlipidemia, diabetes, atrial fibrillation with long-term use anticoagulation of Xarelto who presents to the emergency department after falling out of her wheelchair.  Patient has a history of being unstable on her feet fell out of the wheelchair tonight brought to emergency room for evaluation.  There is no reported loss of consciousness.  Patient reportedly did the same thing about a month ago requiring sutures.  She arrives here with head bandaged resting easily with baseline mentation of alert and oriented to herself    Social History  Unknown    Family History  Unknown    PCP: Tony Shane MD    No current facility-administered medications for this encounter.     Current Outpatient Medications   Medication Sig Dispense Refill    cephALEXin (KEFLEX) 250 MG capsule Take 1 capsule by mouth 3 times daily for 7 days 21 capsule 0    cloNIDine (CATAPRES) 0.3 MG tablet Take 1 tablet by mouth in the morning, at noon, and at bedtime      ergocalciferol (ERGOCALCIFEROL) 1.25 MG (01441 UT) capsule Take 1 capsule by mouth once a week      famotidine (PEPCID) 20 MG tablet Take 1 tablet by mouth daily      levothyroxine (SYNTHROID) 112 MCG tablet Take 1 tablet by mouth Daily      metoprolol succinate (TOPROL XL) 25 MG extended release tablet Take 1 tablet by mouth daily      rosuvastatin (CRESTOR) 20 MG tablet Take 1 tablet by mouth daily      sAXagliptin-metFORMIN ER 5-1000 MG TB24 Take 1 tablet by mouth daily      venlafaxine (EFFEXOR XR) 75 MG extended release capsule Take 1 capsule by mouth daily

## 2024-08-15 NOTE — ED NOTES
Pt voided in brief. Pt cleaned and brief applied, linens changed. Pt remains awake, alert, and oriented to self. Bed alarm on.

## 2024-11-04 ENCOUNTER — APPOINTMENT (OUTPATIENT)
Age: 89
End: 2024-11-04
Payer: MEDICARE

## 2024-11-04 ENCOUNTER — HOSPITAL ENCOUNTER (EMERGENCY)
Age: 89
Discharge: HOME | End: 2024-11-04
Attending: EMERGENCY MEDICINE
Payer: MEDICARE

## 2024-11-04 VITALS
OXYGEN SATURATION: 99 % | RESPIRATION RATE: 18 BRPM | WEIGHT: 138 LBS | SYSTOLIC BLOOD PRESSURE: 166 MMHG | HEART RATE: 88 BPM | DIASTOLIC BLOOD PRESSURE: 86 MMHG | TEMPERATURE: 98.1 F

## 2024-11-04 DIAGNOSIS — N30.00 ACUTE CYSTITIS WITHOUT HEMATURIA: Primary | ICD-10-CM

## 2024-11-04 LAB
ANION GAP SERPL CALC-SCNC: 4 MMOL/L (ref 3–18)
APPEARANCE UR: ABNORMAL
BACTERIA URNS QL MICRO: ABNORMAL /HPF
BASOPHILS # BLD: 0 K/UL (ref 0–0.1)
BASOPHILS NFR BLD: 0 % (ref 0–2)
BILIRUB UR QL: NEGATIVE
BUN SERPL-MCNC: 35 MG/DL (ref 7–18)
BUN/CREAT SERPL: 23 (ref 12–20)
CA-I BLD-MCNC: 10.1 MG/DL (ref 8.5–10.1)
CHLORIDE SERPL-SCNC: 109 MMOL/L (ref 100–111)
CO2 SERPL-SCNC: 30 MMOL/L (ref 21–32)
COLOR UR: YELLOW
CREAT SERPL-MCNC: 1.55 MG/DL (ref 0.6–1.3)
DIFFERENTIAL METHOD BLD: ABNORMAL
EKG ATRIAL RATE: 85 BPM
EKG DIAGNOSIS: NORMAL
EKG P AXIS: 67 DEGREES
EKG P-R INTERVAL: 196 MS
EKG Q-T INTERVAL: 386 MS
EKG QRS DURATION: 80 MS
EKG QTC CALCULATION (BAZETT): 459 MS
EKG R AXIS: -55 DEGREES
EKG T AXIS: 50 DEGREES
EKG VENTRICULAR RATE: 85 BPM
EOSINOPHIL # BLD: 0 K/UL (ref 0–0.4)
EOSINOPHIL NFR BLD: 1 % (ref 0–5)
EPITH CASTS URNS QL MICRO: ABNORMAL /LPF (ref 0–20)
ERYTHROCYTE [DISTWIDTH] IN BLOOD BY AUTOMATED COUNT: 13.7 % (ref 11.6–14.5)
GLUCOSE SERPL-MCNC: 274 MG/DL (ref 74–99)
GLUCOSE UR STRIP.AUTO-MCNC: NEGATIVE MG/DL
HCT VFR BLD AUTO: 35 % (ref 35–45)
HGB BLD-MCNC: 11 G/DL (ref 12–16)
HGB UR QL STRIP: ABNORMAL
IMM GRANULOCYTES # BLD AUTO: 0 K/UL (ref 0–0.04)
IMM GRANULOCYTES NFR BLD AUTO: 0 % (ref 0–0.5)
KETONES UR QL STRIP.AUTO: NEGATIVE MG/DL
LEUKOCYTE ESTERASE UR QL STRIP.AUTO: ABNORMAL
LYMPHOCYTES # BLD: 0.8 K/UL (ref 0.9–3.6)
LYMPHOCYTES NFR BLD: 12 % (ref 21–52)
MCH RBC QN AUTO: 29.3 PG (ref 24–34)
MCHC RBC AUTO-ENTMCNC: 31.4 G/DL (ref 31–37)
MCV RBC AUTO: 93.1 FL (ref 78–100)
MONOCYTES # BLD: 0.4 K/UL (ref 0.05–1.2)
MONOCYTES NFR BLD: 6 % (ref 3–10)
NEUTS SEG # BLD: 5.3 K/UL (ref 1.8–8)
NEUTS SEG NFR BLD: 81 % (ref 40–73)
NITRITE UR QL STRIP.AUTO: NEGATIVE
NRBC # BLD: 0 K/UL (ref 0–0.01)
NRBC BLD-RTO: 0 PER 100 WBC
PH UR STRIP: 7 (ref 5–8)
PLATELET # BLD AUTO: 182 K/UL (ref 135–420)
PMV BLD AUTO: 11.2 FL (ref 9.2–11.8)
POTASSIUM SERPL-SCNC: 4.4 MMOL/L (ref 3.5–5.5)
PROT UR STRIP-MCNC: 100 MG/DL
RBC # BLD AUTO: 3.76 M/UL (ref 4.2–5.3)
RBC #/AREA URNS HPF: ABNORMAL /HPF (ref 0–2)
SODIUM SERPL-SCNC: 143 MMOL/L (ref 136–145)
SP GR UR REFRACTOMETRY: 1.02 (ref 1–1.03)
UROBILINOGEN UR QL STRIP.AUTO: 1 EU/DL (ref 0.2–1)
WBC # BLD AUTO: 6.6 K/UL (ref 4.6–13.2)
WBC URNS QL MICRO: ABNORMAL /HPF (ref 0–4)

## 2024-11-04 PROCEDURE — 72125 CT NECK SPINE W/O DYE: CPT

## 2024-11-04 PROCEDURE — 93005 ELECTROCARDIOGRAM TRACING: CPT | Performed by: EMERGENCY MEDICINE

## 2024-11-04 PROCEDURE — 80048 BASIC METABOLIC PNL TOTAL CA: CPT

## 2024-11-04 PROCEDURE — 81001 URINALYSIS AUTO W/SCOPE: CPT

## 2024-11-04 PROCEDURE — 70450 CT HEAD/BRAIN W/O DYE: CPT

## 2024-11-04 PROCEDURE — 96374 THER/PROPH/DIAG INJ IV PUSH: CPT

## 2024-11-04 PROCEDURE — 6360000002 HC RX W HCPCS: Performed by: EMERGENCY MEDICINE

## 2024-11-04 PROCEDURE — 85025 COMPLETE CBC W/AUTO DIFF WBC: CPT

## 2024-11-04 PROCEDURE — 99284 EMERGENCY DEPT VISIT MOD MDM: CPT

## 2024-11-04 PROCEDURE — 2580000003 HC RX 258: Performed by: EMERGENCY MEDICINE

## 2024-11-04 RX ORDER — MEMANTINE HYDROCHLORIDE 5 MG/1
5 TABLET ORAL 2 TIMES DAILY
COMMUNITY

## 2024-11-04 RX ORDER — TRAZODONE HYDROCHLORIDE 50 MG/1
50 TABLET, FILM COATED ORAL NIGHTLY
COMMUNITY

## 2024-11-04 RX ORDER — CEPHALEXIN 500 MG/1
500 CAPSULE ORAL 2 TIMES DAILY
Qty: 20 CAPSULE | Refills: 0 | Status: SHIPPED | OUTPATIENT
Start: 2024-11-04 | End: 2024-11-14

## 2024-11-04 RX ORDER — CEPHALEXIN 500 MG/1
500 CAPSULE ORAL 2 TIMES DAILY
Qty: 20 CAPSULE | Refills: 0 | Status: SHIPPED | OUTPATIENT
Start: 2024-11-04 | End: 2024-11-04

## 2024-11-04 RX ORDER — DONEPEZIL HYDROCHLORIDE 10 MG/1
10 TABLET, FILM COATED ORAL NIGHTLY
COMMUNITY

## 2024-11-04 RX ADMIN — WATER 1000 MG: 1 INJECTION INTRAMUSCULAR; INTRAVENOUS; SUBCUTANEOUS at 12:02

## 2024-11-04 ASSESSMENT — PAIN - FUNCTIONAL ASSESSMENT: PAIN_FUNCTIONAL_ASSESSMENT: 0-10

## 2024-11-04 ASSESSMENT — PAIN SCALES - GENERAL: PAINLEVEL_OUTOF10: 0

## 2024-11-04 NOTE — ED TRIAGE NOTES
Pt brought to ED via EMS from [facility] following a GLF  it is unknown if fall was witnessed.  Pt fell around 4-430am   pt has large hematoma noted just above right eye, right eye is swollen shut.   Pt alert to self only, reported by EMS that this is pt's baseline

## 2024-11-04 NOTE — DISCHARGE INSTRUCTIONS
Thank you for choosing our Emergency Department for your care.  It is our privilege to care for you in your time of need.  In the next several days, you may receive a survey via email or mailed to your home about your experience with our team.  We would greatly appreciate you taking a few minutes to complete the survey, as we use this information to learn what we have done well and what we could be doing better. Thank you for trusting us with your care!    Below you will find a list of your tests from today's visit.   Labs  Recent Results (from the past 12 hour(s))   CBC with Auto Differential    Collection Time: 11/04/24  9:40 AM   Result Value Ref Range    WBC 6.6 4.6 - 13.2 K/uL    RBC 3.76 (L) 4.20 - 5.30 M/uL    Hemoglobin 11.0 (L) 12.0 - 16.0 g/dL    Hematocrit 35.0 35.0 - 45.0 %    MCV 93.1 78.0 - 100.0 FL    MCH 29.3 24.0 - 34.0 PG    MCHC 31.4 31.0 - 37.0 g/dL    RDW 13.7 11.6 - 14.5 %    Platelets 182 135 - 420 K/uL    MPV 11.2 9.2 - 11.8 FL    Nucleated RBCs 0.0 0.0  WBC    nRBC 0.00 0.00 - 0.01 K/uL    Neutrophils % 81 (H) 40 - 73 %    Lymphocytes % 12 (L) 21 - 52 %    Monocytes % 6 3 - 10 %    Eosinophils % 1 0 - 5 %    Basophils % 0 0 - 2 %    Immature Granulocytes % 0 0 - 0.5 %    Neutrophils Absolute 5.3 1.8 - 8.0 K/UL    Lymphocytes Absolute 0.8 (L) 0.9 - 3.6 K/UL    Monocytes Absolute 0.4 0.05 - 1.2 K/UL    Eosinophils Absolute 0.0 0.0 - 0.4 K/UL    Basophils Absolute 0.0 0.0 - 0.1 K/UL    Immature Granulocytes Absolute 0.0 0.00 - 0.04 K/UL    Differential Type AUTOMATED     BMP    Collection Time: 11/04/24  9:40 AM   Result Value Ref Range    Sodium 143 136 - 145 mmol/L    Potassium 4.4 3.5 - 5.5 mmol/L    Chloride 109 100 - 111 mmol/L    CO2 30 21 - 32 mmol/L    Anion Gap 4 3.0 - 18.0 mmol/L    Glucose 274 (H) 74 - 99 mg/dL    BUN 35 (H) 7 - 18 mg/dL    Creatinine 1.55 (H) 0.60 - 1.30 mg/dL    BUN/Creatinine Ratio 23 (H) 12 - 20      Est, Glom Filt Rate 31 (L) >60 ml/min/1.73m2

## 2024-11-04 NOTE — ED PROVIDER NOTES
these errors. Please excuse any errors that have escaped final proofreading.)     Royal Vieyra MD  11/05/24 0705